# Patient Record
Sex: FEMALE | Race: WHITE | Employment: OTHER | URBAN - METROPOLITAN AREA
[De-identification: names, ages, dates, MRNs, and addresses within clinical notes are randomized per-mention and may not be internally consistent; named-entity substitution may affect disease eponyms.]

---

## 2021-10-27 ENCOUNTER — TRANSCRIBE ORDER (OUTPATIENT)
Dept: GENERAL RADIOLOGY | Age: 61
End: 2021-10-27

## 2021-10-27 ENCOUNTER — HOSPITAL ENCOUNTER (OUTPATIENT)
Dept: GENERAL RADIOLOGY | Age: 61
Discharge: HOME | End: 2021-10-27
Payer: COMMERCIAL

## 2021-10-27 DIAGNOSIS — M19.90 ARTHRITIS: Primary | ICD-10-CM

## 2021-10-27 DIAGNOSIS — M19.90 ARTHRITIS: ICD-10-CM

## 2021-10-27 PROCEDURE — 73130 X-RAY EXAM OF HAND: CPT

## 2021-12-08 ENCOUNTER — TELEPHONE (OUTPATIENT)
Dept: RHEUMATOLOGY | Age: 61
End: 2021-12-08

## 2022-01-10 ENCOUNTER — OFFICE VISIT (OUTPATIENT)
Dept: RHEUMATOLOGY | Age: 62
End: 2022-01-10
Payer: COMMERCIAL

## 2022-01-10 VITALS
WEIGHT: 256.4 LBS | HEART RATE: 62 BPM | BODY MASS INDEX: 43.77 KG/M2 | HEIGHT: 64 IN | OXYGEN SATURATION: 94 % | TEMPERATURE: 98.1 F | DIASTOLIC BLOOD PRESSURE: 80 MMHG | RESPIRATION RATE: 20 BRPM | SYSTOLIC BLOOD PRESSURE: 124 MMHG

## 2022-01-10 DIAGNOSIS — G57.12 MERALGIA PARESTHETICA OF LEFT SIDE: ICD-10-CM

## 2022-01-10 DIAGNOSIS — E03.9 ACQUIRED HYPOTHYROIDISM: ICD-10-CM

## 2022-01-10 DIAGNOSIS — M54.2 CERVICALGIA: ICD-10-CM

## 2022-01-10 DIAGNOSIS — Z98.84 HISTORY OF GASTRIC BYPASS: ICD-10-CM

## 2022-01-10 DIAGNOSIS — G56.21 ULNAR NEUROPATHY OF RIGHT UPPER EXTREMITY: ICD-10-CM

## 2022-01-10 DIAGNOSIS — M72.0 DUPUYTREN'S CONTRACTURE OF BOTH HANDS: ICD-10-CM

## 2022-01-10 DIAGNOSIS — E03.8 OTHER SPECIFIED HYPOTHYROIDISM: ICD-10-CM

## 2022-01-10 DIAGNOSIS — G62.9 PERIPHERAL POLYNEUROPATHY: ICD-10-CM

## 2022-01-10 DIAGNOSIS — M19.90 CHRONIC INFLAMMATORY ARTHRITIS: Primary | ICD-10-CM

## 2022-01-10 PROCEDURE — 99205 OFFICE O/P NEW HI 60 MIN: CPT | Performed by: INTERNAL MEDICINE

## 2022-01-10 RX ORDER — DOFETILIDE 0.5 MG/1
1 CAPSULE ORAL 2 TIMES DAILY
COMMUNITY

## 2022-01-10 RX ORDER — LOSARTAN POTASSIUM 25 MG/1
TABLET ORAL
COMMUNITY

## 2022-01-10 RX ORDER — CYANOCOBALAMIN (VITAMIN B-12) 500 MCG
TABLET ORAL DAILY
COMMUNITY

## 2022-01-10 RX ORDER — METOPROLOL SUCCINATE 100 MG/1
TABLET, EXTENDED RELEASE ORAL
COMMUNITY
Start: 2021-12-29

## 2022-01-10 RX ORDER — CETIRIZINE HCL 10 MG
1 TABLET ORAL DAILY
COMMUNITY

## 2022-01-10 RX ORDER — POTASSIUM CHLORIDE 20 MEQ/1
TABLET, EXTENDED RELEASE ORAL
COMMUNITY

## 2022-01-10 RX ORDER — ALBUTEROL SULFATE 90 UG/1
AEROSOL, METERED RESPIRATORY (INHALATION)
COMMUNITY

## 2022-01-10 RX ORDER — FUROSEMIDE 20 MG/1
TABLET ORAL
COMMUNITY
Start: 2021-12-16

## 2022-01-10 RX ORDER — ROSUVASTATIN CALCIUM 5 MG/1
1 TABLET, COATED ORAL DAILY
COMMUNITY

## 2022-01-10 NOTE — PATIENT INSTRUCTIONS
1. You have dupuytren's contracture of both hands, which is likely to continue reducing mobility in your hand until this is addressed surgically. I'm referring you to occupational therapy, and whenever you're ready let me know and I will refer you to a hand surgeon. 2. You have some changes on xray of rheumatoid arthritis, but I don't see active joint inflammation today. I'm ordering you an MRI of the hand to evaluate this, call [phone] to schedule. 3. Xrays of the neck and low back wherever you end up going for your MRI; try to stay in the [facility] system so I can see the images myself. 4. The thigh numbness is called meralgia paresthetica, which is a compression of a nerve going to the skin caused by extra pressure around the front of the groin. Losing weight should help with this; avoid tight-fitting pants or belts as well. See below for more information on meralgia paresthetica. 5. I'm ordering you an EMG to see if your right finger numbnesses are coming from nerve compressions in your hand, your elbow, or your neck. Dupuytren's Contracture: Care Instructions  Your Care Instructions     In Dupuytren's contracture, the fingers become stiff and curl toward the palm. It is caused by thick tissue that grows under the skin in the palm of the hand. Sometimes the condition affects the palm but not the fingers. If the tissue gets thicker and affects one or more fingers, it may limit movement of your fingers and hand. Sometimes the condition can occur in the soles of the feet. The cause of Dupuytren's disease is not known. Dupuytren's disease may get worse slowly. If you have mild Dupuytren's disease, you may be able to keep your fingers moving with regular stretching. Surgery usually helps in severe cases. However, Dupuytren's disease can come back. Follow-up care is a key part of your treatment and safety.  Be sure to make and go to all appointments, and call your doctor if you are having problems. It's also a good idea to know your test results and keep a list of the medicines you take. How can you care for yourself at home? · Follow your doctor's advice for physical or occupational therapy and exercises to put your fingers and hand through a range of motion. · Two times a day, massage your hand and gently stretch the fingers back. This can get rid of tightness and help keep your fingers flexible. · Try to avoid curling your hand tightly. For example, use utensils and tools that have larger hand . When should you call for help? Call your doctor now or seek immediate medical care if:    · You have numbness in your fingers.     · You have a wound or sore on your finger or palm.     · Your hand or fingers get worse. Watch closely for changes in your health, and be sure to contact your doctor if you have any problems. Where can you learn more? Go to http://www.gray.com/  Enter T888 in the search box to learn more about \"Dupuytren's Contracture: Care Instructions. \"  Current as of: July 1, 2021               Content Version: 13.0  © 2362-2279 [employer]. Care instructions adapted under license by [employer] (which disclaims liability or warranty for this information). If you have questions about a medical condition or this instruction, always ask your healthcare professional. [address] any warranty or liability for your use of this information. Meralgia Paresthetica: Care Instructions  Your Care Instructions  Meralgia paresthetica (say \"muh-RAL-juh ndx-abl-ONDE-ick-uh\") is pain and numbness in the outer part of your thigh. The pain might get worse after you walk or stand for a long time. This pain and numbness occur when a nerve in your thigh is pinched (compressed). Sometimes the problem is caused by wearing tight clothing or being overweight.   Most of the time the problem goes away on its own in a few months. Lowering any pressure on the thigh area may help. Wear loose clothes, and lose weight if you need to. Follow-up care is a key part of your treatment and safety. Be sure to make and go to all appointments, and call your doctor if you are having problems. It's also a good idea to know your test results and keep a list of the medicines you take. How can you care for yourself at home? · Most times the problem gets better on its own. Try wearing loose clothing to see if this helps. · Lose weight if you need to. Talk with your doctor if you need help. When should you call for help? Watch closely for changes in your health, and be sure to contact your doctor if:    · You have new symptoms, such as pain that gets worse or new numbness in your thigh.     · You do not get better as expected. Where can you learn more? Go to http://www.gray.com/  Enter C468 in the search box to learn more about \"Meralgia Paresthetica: Care Instructions. \"  Current as of: April 8, 2021               Content Version: 13.0  © 5429-3751 [employer]. Care instructions adapted under license by [employer] (which disclaims liability or warranty for this information). If you have questions about a medical condition or this instruction, always ask your healthcare professional. [address] any warranty or liability for your use of this information.

## 2022-01-10 NOTE — PROGRESS NOTES
REASON FOR VISIT:    is a 59 y.o. female with history of COPD, sciatica, and recurrent DVT with PE who is being referred to [facility] at the request of Dr. Melo, regarding a diagnosis of hand pain. HISTORY OF PRESENT ILLNESS    About 3 months ago first noticed right ulnar 2 finger numbness. Swelling of anterior MCPs seemed to start around the same time. Feels mildly weaker in both hands, no particular problems dropping things at home. Took diclofenac but upset stomach. Tried prednisone taper but didn't make any difference. Has been trying to avoid tylenol due to her other medications, takes 2 tylenol when pain is severe but only needing once a week or so. Last week had an episode of slipping on ice and awkwardly turning her head, has had improving twinging pains in the base of the left neck since then. Right-handed. Since hospitalized last June for complications of afib with weakness after a prolonged hospitalizaiton, has been on disability from prior job of CNA. Denies recent increased activity around the house, just her usual work grocery shopping, going to [employer]. Left distal lateral thigh has also been numb, noticed it within the last 3 months. Doesn't associate it with particular changes in chronic back pain. Gets cramping pains in her lower ribs, usually transient, without relation to food. Massage and PT has nearly resolved her old right-sided sciatica she had in her 19's. Now gets few seconds of shooting pains into right knee when sitting for     Had a DVT and PE at 39yo diagnosed on workup of right leg swelling and then dyspnea. Was smoking at the time but not on OCPs, treated short-term initially with [facility], then years later noticed ankle swelling on the right and diagnosed with a recurrence, now has been on Eliquis for about the last 2 years. REVIEW OF SYSTEMS  A comprehensive review of systems was negative except for that written in the HPI.   A 10-point review of systems is per the new patient questionnaire, which has been reviewed extensively and scanned into the electronic medical record for future reference. Review of systems is as above and is otherwise negative. ALLERGIES  Topiramate    MEDICATIONS  Current Outpatient Medications   Medication Sig    albuterol (PROVENTIL HFA, VENTOLIN HFA, PROAIR HFA) 90 mcg/actuation inhaler INHALE 2 PUFFS BY MOUTH EVERY 6 HOURS AS NEEDED    apixaban (Eliquis) 5 mg tablet Take 1 Tablet by mouth two (2) times a day.  cetirizine (ZYRTEC) 10 mg tablet Take 1 Tablet by mouth daily.  dofetilide (TIKOSYN) 500 mcg capsule Take 1 Capsule by mouth two (2) times a day.  metoprolol succinate (TOPROL-XL) 100 mg tablet     losartan (COZAAR) 25 mg tablet TAKE 2 TABLETS (50MG) BY MOUTH EVERY DAY    rosuvastatin (CRESTOR) 5 mg tablet Take 1 Tablet by mouth daily.  cholecalciferol (VITAMIN D3) (400 Units /10 mcg) tab tablet Take  by mouth daily.  furosemide (LASIX) 20 mg tablet     potassium chloride (Klor-Con M20) 20 mEq tablet TAKE 1 TABLET BY MOUTH AS NEEDED WITH USE OF FUROSEMIDE (LASIX)     No current facility-administered medications for this visit. PAST MEDICAL HISTORY  Past Medical History:   Diagnosis Date    [facility] obstructive pulmonary disease ([facility] [address]     Thromboembolus ([facility] [address]        FAMILY HISTORY  No known family history of lupus, RA, or psoriasis. SOCIAL HISTORY  She  reports that she has quit smoking. She has never used smokeless tobacco. She reports previous alcohol use. She reports that she does not use drugs. Social History     Social History Narrative    Not on file   Former CNA, now on disability    DATA  Visit Vitals  /80 (BP 1 Location: Right arm, BP Patient Position: Sitting)   Pulse 62   Temp 98.1 °F (36.7 °C) (Oral)   Resp 20   Ht 5' 4\" (1.626 m)   Wt 256 lb 6.4 oz (116.3 kg)   SpO2 94%   BMI 44.01 kg/m²    Body mass index is 44.01 kg/m².  No flowsheet data found.  General:  The patient is pleasant, kyphotic, obese, alert, and in no apparent distress. Eyes: Sclera are anicteric. No conjunctival injection. HEENT:  Oropharynx is clear. No oral ulcers. Adequate salivary pooling. No cervical or supraclavicular lymphadenopathy. Lungs:  Clear to auscultation bilaterally, without wheeze or stridor. Normal respiratory effort. Cor:  Regular rate and rhythm. No murmurs rubs or gallops   Abdomen: Soft, non-tender, without hepatomegaly or masses. Extremities: No calf tenderness. 1+ B LE edema to mid-shin with brawny edema and rubor c/w chronic venous stasis. Warm and well perfused. Skin:  Onychomycotic thickening of toenails, no palmoplantar rashes; no petechial, purpuric, or psoriaform rashes   Neuro: Decreased sensation over anterolateral left thigh. Negative seated SLR. Musculoskeletal:    A comprehensive musculoskeletal exam was performed for all joints of each upper and lower extremity and assessed for swelling, tenderness and range of motion. Results are documented as below:  Bilateral shoulder crepitus with intact ROM. Tenderness of base of right neck at full rightward rotation  Bilateral Dupuytren's with early left> right flexion contractures of 4th finger. Synovial prominence of left PIP3 and 4 without tenderness  No tenderness in hands or wrists  No evidence of synovitis in the small joints of the hands, wrists, shoulders, elbows, hips, knees or ankles. Labs:   10/13/21: RF neg, CCP neg, BALDEMAR direct negative      Imaging:  10/27/21: XR hands: Three views of the right hand demonstrate no fracture, dislocation or other  acute osseous or articular abnormality. Mineralization is normal. There is  osteophytosis involving the IP joint as well as the second through fifth DIP  joints. The soft tissues are within normal limits. There appears to be a  corticated erosion of the ulnar styloid.  There is moderate lateral subluxation  of the first metacarpal with associated subchondral sclerosis and osteophytosis. Three views of the left hand demonstrate no fracture, dislocation or other acute  osseous or articular abnormality. Mineralization is normal. There is  osteophytosis involving the second through fifth DIP and PIP joints. The soft  tissues are within normal limits. There appears to be a corticated erosion of  the ulnar styloid. There is mild lateral subluxation of the first metacarpal.      ASSESSMENT AND PLAN  Ms. Gary Gonzalez is a 59 y.o. female who presents for evaluation of early palmar contracture 2/2 Dupuytren's, ulnar distribution paresthesias, and MRI e/o corticated ulnar styloid erosions all however without kahlil inflammatory arthritis by symptoms or exam. Holding off on initiation of immunosuppression, and first evaluating whether significant underlying synovitis is present to warrant targeting. Reviewed symptomatic management of dupuytren's and meralgia paresthetica, which seems to be driving her left thigh dysesthesia. 1. Dupuytren's contracture of both hands  - EMG LIMITED; Future  - MRI HAND RT WO CONT; Future  - TSH REFLEX TO T4; Future  - ANTI-NUCLEAR AB BY IFA (RDL); Future  - TSH REFLEX TO T4  - ANTI-NUCLEAR AB BY IFA (RDL)    2. Meralgia paresthetica of left side  - XR SPINE LUMB MIN 4 V; Future    3. Ulnar neuropathy of right upper extremity  - EMG LIMITED; Future  - XR SPINE CERV W OBL/FLEX/EXT MIN 6 V COMP; Future  - MRI HAND RT WO CONT; Future  - VITAMIN B12; Future  - TSH REFLEX TO T4; Future    4. Cervicalgia  - XR SPINE CERV W OBL/FLEX/EXT MIN 6 V COMP; Future    5. Chronic inflammatory arthritis  - MRI HAND RT WO CONT; Future  - C REACTIVE PROTEIN, QT; Future  - CBC WITH AUTOMATED DIFF; Future  - METABOLIC PANEL, COMPREHENSIVE; Future  - SED RATE (ESR); Future  - VITAMIN B12; Future  - ANTI-NUCLEAR AB BY IFA (RDL); Future    6. Peripheral polyneuropathy  - VITAMIN B12; Future  - ANTI-NUCLEAR AB BY IFA (RDL); Future    7.  History of gastric bypass  - VITAMIN B12; Future    8. Acquired hypothyroidism  - TSH REFLEX TO T4; Future    Patient Instructions     1. You have dupuytren's contracture of both hands, which is likely to continue reducing mobility in your hand until this is addressed surgically. I'm referring you to occupational therapy, and whenever you're ready let me know and I will refer you to a hand surgeon. 2. You have some changes on xray of rheumatoid arthritis, but I don't see active joint inflammation today. I'm ordering you an MRI of the hand to evaluate this, call [phone] to schedule. 3. Xrays of the neck and low back wherever you end up going for your MRI; try to stay in the [facility] system so I can see the images myself. 4. The thigh numbness is called meralgia paresthetica, which is a compression of a nerve going to the skin caused by extra pressure around the front of the groin. Losing weight should help with this; avoid tight-fitting pants or belts as well. See below for more information on meralgia paresthetica. 5. I'm ordering you an EMG to see if your right finger numbnesses are coming from nerve compressions in your hand, your elbow, or your neck. Dupuytren's Contracture: Care Instructions  Your Care Instructions  . .. Meralgia Paresthetica: Care Instructions  Your Care Instructions  . ..           Orders Placed This Encounter    XR SPINE CERV W OBL/FLEX/EXT MIN 6 V COMP    XR SPINE LUMB MIN 4 V    MRI HAND RT WO CONT    C REACTIVE PROTEIN, QT    CBC WITH AUTOMATED DIFF    METABOLIC PANEL, COMPREHENSIVE    SED RATE (ESR)    VITAMIN B12    TSH REFLEX TO T4    ANTI-NUCLEAR AB BY IFA (RDL)    EMG LIMITED    albuterol (PROVENTIL HFA, VENTOLIN HFA, PROAIR HFA) 90 mcg/actuation inhaler    apixaban (Eliquis) 5 mg tablet    cetirizine (ZYRTEC) 10 mg tablet    dofetilide (TIKOSYN) 500 mcg capsule    metoprolol succinate (TOPROL-XL) 100 mg tablet    losartan (COZAAR) 25 mg tablet    rosuvastatin (CRESTOR) 5 mg tablet    cholecalciferol (VITAMIN D3) (400 Units /10 mcg) tab tablet    furosemide (LASIX) 20 mg tablet    potassium chloride (Klor-Con M20) 20 mEq tablet       Medications: I am having Tuyet Carey maintain her albuterol, apixaban, cetirizine, dofetilide, metoprolol succinate, losartan, rosuvastatin, cholecalciferol, furosemide, and potassium chloride. Follow up: Return in about 2 months (around 3/10/2022).     Face to face time: 50 minutes  Note preparation and records review day of service: 25 minutes  Total provider time day of service: 75 minutes    Drew Jones MD    Adult Rheumatology   [address] [place] [address], [address] [place] [address]   Phone [phone]  Fax [phone]

## 2022-01-10 NOTE — LETTER
1/17/2022    Patient: Yani Szymanski   YOB: 1960   Date of Visit: 1/10/2022     Krissy Nicholson DO  [address] [place] [facility] [address] [id number]  Via Fax: [phone]    Dear Krissy Nicholson DO,      Thank you for referring Ms. Yani Szymanski to [address] [place] [address] for evaluation. My notes for this consultation are attached. If you have questions, please do not hesitate to call me. I look forward to following your patient along with you.       Sincerely,    Deanna Leal MD  Cell: [phone]

## 2022-01-11 ENCOUNTER — TELEPHONE (OUTPATIENT)
Dept: RHEUMATOLOGY | Age: 62
End: 2022-01-11

## 2022-01-11 NOTE — TELEPHONE ENCOUNTER
Jennifer Bolton notified that someone from central scheduling will be giving them a call to schedule the MRI and they may give the office a call back if they don't here from them in a couple of days.

## 2022-01-11 NOTE — TELEPHONE ENCOUNTER
Poli Espinoza called on behalf of the pt, they would like to know if Shannon scheduled the pt for a MRI.     Would like a follow up regarding the MRI.  [phone]

## 2022-01-17 ENCOUNTER — TELEPHONE (OUTPATIENT)
Dept: RHEUMATOLOGY | Age: 62
End: 2022-01-17

## 2022-01-17 NOTE — TELEPHONE ENCOUNTER
Patient called to see if Dr wants X-ray for the feet as well as the hands. Order to go to [facility].     Best call back number is [phone]

## 2022-01-27 ENCOUNTER — HOSPITAL ENCOUNTER (OUTPATIENT)
Dept: MRI IMAGING | Age: 62
Discharge: HOME | End: 2022-01-27
Attending: INTERNAL MEDICINE
Payer: COMMERCIAL

## 2022-01-27 DIAGNOSIS — M72.0 DUPUYTREN'S CONTRACTURE OF BOTH HANDS: ICD-10-CM

## 2022-01-27 DIAGNOSIS — M19.90 CHRONIC INFLAMMATORY ARTHRITIS: ICD-10-CM

## 2022-01-27 DIAGNOSIS — G56.21 ULNAR NEUROPATHY OF RIGHT UPPER EXTREMITY: ICD-10-CM

## 2022-01-27 PROCEDURE — 73218 MRI UPPER EXTREMITY W/O DYE: CPT

## 2022-01-29 DIAGNOSIS — M72.0 DUPUYTREN'S CONTRACTURE OF BOTH HANDS: Primary | ICD-10-CM

## 2022-01-29 DIAGNOSIS — G56.21 ULNAR NEUROPATHY AT WRIST, RIGHT: ICD-10-CM

## 2022-01-29 NOTE — PROGRESS NOTES
Please let her know MRI shows osteoarthritis and dupuytren's, but no sign of active rheumatoid arthritis.  I'd recommend she see a hand orthopedist to see if intervention is needed to prevent more long-term damage of the nerves that are causing some of the numbness in her ring and pinkie fingers

## 2022-01-31 NOTE — PROGRESS NOTES
Pt's demographics, office notes and MRI results faxed to Dr. Heide Horton office with message to contact patient for new patient appt.

## 2022-03-07 ENCOUNTER — TELEPHONE (OUTPATIENT)
Dept: RHEUMATOLOGY | Age: 62
End: 2022-03-07

## 2022-03-07 NOTE — TELEPHONE ENCOUNTER
Pt called to cancel appt due to recent hospital visit resulting in finding a mass. Pt will like to call back to reschedule once taking care of mass.

## 2022-04-05 ENCOUNTER — TRANSCRIBE ORDER (OUTPATIENT)
Dept: SCHEDULING | Age: 62
End: 2022-04-05

## 2022-04-05 DIAGNOSIS — C34.90 SMALL CELL LUNG CANCER (HCC): Primary | ICD-10-CM
